# Patient Record
Sex: FEMALE | Race: WHITE | NOT HISPANIC OR LATINO
[De-identification: names, ages, dates, MRNs, and addresses within clinical notes are randomized per-mention and may not be internally consistent; named-entity substitution may affect disease eponyms.]

---

## 2019-08-20 ENCOUNTER — MESSAGE (OUTPATIENT)
Age: 24
End: 2019-08-20

## 2019-08-20 PROBLEM — Z00.00 ENCOUNTER FOR PREVENTIVE HEALTH EXAMINATION: Status: ACTIVE | Noted: 2019-08-20

## 2019-08-27 ENCOUNTER — FORM ENCOUNTER (OUTPATIENT)
Age: 24
End: 2019-08-27

## 2019-08-28 ENCOUNTER — OUTPATIENT (OUTPATIENT)
Dept: OUTPATIENT SERVICES | Facility: HOSPITAL | Age: 24
LOS: 1 days | End: 2019-08-28
Payer: COMMERCIAL

## 2019-08-28 ENCOUNTER — APPOINTMENT (OUTPATIENT)
Dept: ORTHOPEDIC SURGERY | Facility: CLINIC | Age: 24
End: 2019-08-28
Payer: COMMERCIAL

## 2019-08-28 VITALS — BODY MASS INDEX: 31.89 KG/M2 | WEIGHT: 180 LBS | HEIGHT: 63 IN

## 2019-08-28 PROCEDURE — 99203 OFFICE O/P NEW LOW 30 MIN: CPT

## 2019-08-28 PROCEDURE — 73522 X-RAY EXAM HIPS BI 3-4 VIEWS: CPT | Mod: 26

## 2019-08-29 ENCOUNTER — INPATIENT (INPATIENT)
Facility: HOSPITAL | Age: 24
LOS: 1 days | Discharge: HOME | DRG: 470 | End: 2019-08-31
Attending: ORTHOPAEDIC SURGERY | Admitting: ORTHOPAEDIC SURGERY
Payer: COMMERCIAL

## 2019-08-29 ENCOUNTER — APPOINTMENT (OUTPATIENT)
Dept: ORTHOPEDIC SURGERY | Facility: HOSPITAL | Age: 24
End: 2019-08-29
Payer: COMMERCIAL

## 2019-08-29 VITALS
HEIGHT: 63 IN | SYSTOLIC BLOOD PRESSURE: 116 MMHG | WEIGHT: 187.39 LBS | RESPIRATION RATE: 20 BRPM | HEART RATE: 86 BPM | TEMPERATURE: 99 F | DIASTOLIC BLOOD PRESSURE: 70 MMHG

## 2019-08-29 DIAGNOSIS — M87.00 IDIOPATHIC ASEPTIC NECROSIS OF UNSPECIFIED BONE: ICD-10-CM

## 2019-08-29 DIAGNOSIS — Z98.890 OTHER SPECIFIED POSTPROCEDURAL STATES: Chronic | ICD-10-CM

## 2019-08-29 DIAGNOSIS — M87.9 OSTEONECROSIS, UNSPECIFIED: ICD-10-CM

## 2019-08-29 LAB — HCG SERPL-ACNC: <0 MIU/ML — SIGNIFICANT CHANGE UP

## 2019-08-29 PROCEDURE — 72170 X-RAY EXAM OF PELVIS: CPT | Mod: 26

## 2019-08-29 PROCEDURE — 20999 UNLISTED PX MUSCSKEL GENERAL: CPT

## 2019-08-29 PROCEDURE — 26992 DRAINAGE OF BONE LESION: CPT | Mod: LT

## 2019-08-29 PROCEDURE — 27036 CAPSULECTOMY/CAPSULOTOMY HIP: CPT | Mod: 59,LT

## 2019-08-29 PROCEDURE — 73522 X-RAY EXAM HIPS BI 3-4 VIEWS: CPT

## 2019-08-29 PROCEDURE — 27130 TOTAL HIP ARTHROPLASTY: CPT | Mod: RT

## 2019-08-29 PROCEDURE — 27130 TOTAL HIP ARTHROPLASTY: CPT | Mod: AS,RT

## 2019-08-29 RX ORDER — ASPIRIN/CALCIUM CARB/MAGNESIUM 324 MG
325 TABLET ORAL
Refills: 0 | Status: DISCONTINUED | OUTPATIENT
Start: 2019-08-29 | End: 2019-08-31

## 2019-08-29 RX ORDER — OXYCODONE HYDROCHLORIDE 5 MG/1
5 TABLET ORAL EVERY 4 HOURS
Refills: 0 | Status: DISCONTINUED | OUTPATIENT
Start: 2019-08-29 | End: 2019-08-30

## 2019-08-29 RX ORDER — CEFAZOLIN SODIUM 1 G
2000 VIAL (EA) INJECTION EVERY 8 HOURS
Refills: 0 | Status: COMPLETED | OUTPATIENT
Start: 2019-08-29 | End: 2019-08-30

## 2019-08-29 RX ORDER — CEFAZOLIN SODIUM 1 G
2000 VIAL (EA) INJECTION EVERY 8 HOURS
Refills: 0 | Status: DISCONTINUED | OUTPATIENT
Start: 2019-08-29 | End: 2019-08-29

## 2019-08-29 RX ORDER — HYDROMORPHONE HYDROCHLORIDE 2 MG/ML
0.5 INJECTION INTRAMUSCULAR; INTRAVENOUS; SUBCUTANEOUS EVERY 4 HOURS
Refills: 0 | Status: DISCONTINUED | OUTPATIENT
Start: 2019-08-29 | End: 2019-08-31

## 2019-08-29 RX ORDER — ASPIRIN/CALCIUM CARB/MAGNESIUM 324 MG
81 TABLET ORAL
Refills: 0 | Status: DISCONTINUED | OUTPATIENT
Start: 2019-08-29 | End: 2019-08-29

## 2019-08-29 RX ORDER — MAGNESIUM HYDROXIDE 400 MG/1
30 TABLET, CHEWABLE ORAL DAILY
Refills: 0 | Status: DISCONTINUED | OUTPATIENT
Start: 2019-08-29 | End: 2019-08-31

## 2019-08-29 RX ORDER — METOCLOPRAMIDE HCL 10 MG
10 TABLET ORAL EVERY 8 HOURS
Refills: 0 | Status: DISCONTINUED | OUTPATIENT
Start: 2019-08-29 | End: 2019-08-29

## 2019-08-29 RX ORDER — OXYCODONE HYDROCHLORIDE 5 MG/1
10 TABLET ORAL EVERY 4 HOURS
Refills: 0 | Status: DISCONTINUED | OUTPATIENT
Start: 2019-08-29 | End: 2019-08-30

## 2019-08-29 RX ORDER — CHLORHEXIDINE GLUCONATE 213 G/1000ML
1 SOLUTION TOPICAL EVERY 12 HOURS
Refills: 0 | Status: DISCONTINUED | OUTPATIENT
Start: 2019-08-29 | End: 2019-08-31

## 2019-08-29 RX ORDER — METOCLOPRAMIDE HCL 10 MG
10 TABLET ORAL EVERY 8 HOURS
Refills: 0 | Status: DISCONTINUED | OUTPATIENT
Start: 2019-08-29 | End: 2019-08-31

## 2019-08-29 RX ORDER — BUPIVACAINE 13.3 MG/ML
20 INJECTION, SUSPENSION, LIPOSOMAL INFILTRATION ONCE
Refills: 0 | Status: DISCONTINUED | OUTPATIENT
Start: 2019-08-29 | End: 2019-08-31

## 2019-08-29 RX ORDER — SENNA PLUS 8.6 MG/1
2 TABLET ORAL AT BEDTIME
Refills: 0 | Status: DISCONTINUED | OUTPATIENT
Start: 2019-08-29 | End: 2019-08-31

## 2019-08-29 RX ORDER — ACETAMINOPHEN 500 MG
650 TABLET ORAL EVERY 6 HOURS
Refills: 0 | Status: DISCONTINUED | OUTPATIENT
Start: 2019-08-29 | End: 2019-08-30

## 2019-08-29 RX ORDER — ONDANSETRON 8 MG/1
4 TABLET, FILM COATED ORAL EVERY 6 HOURS
Refills: 0 | Status: DISCONTINUED | OUTPATIENT
Start: 2019-08-29 | End: 2019-08-31

## 2019-08-29 RX ORDER — HYDROMORPHONE HYDROCHLORIDE 2 MG/ML
0.5 INJECTION INTRAMUSCULAR; INTRAVENOUS; SUBCUTANEOUS
Refills: 0 | Status: DISCONTINUED | OUTPATIENT
Start: 2019-08-29 | End: 2019-08-29

## 2019-08-29 RX ORDER — SODIUM CHLORIDE 9 MG/ML
1000 INJECTION, SOLUTION INTRAVENOUS
Refills: 0 | Status: DISCONTINUED | OUTPATIENT
Start: 2019-08-29 | End: 2019-08-30

## 2019-08-29 RX ORDER — POLYETHYLENE GLYCOL 3350 17 G/17G
17 POWDER, FOR SOLUTION ORAL DAILY
Refills: 0 | Status: DISCONTINUED | OUTPATIENT
Start: 2019-08-29 | End: 2019-08-31

## 2019-08-29 RX ORDER — POVIDONE-IODINE 5 %
1 AEROSOL (ML) TOPICAL ONCE
Refills: 0 | Status: COMPLETED | OUTPATIENT
Start: 2019-08-29 | End: 2019-08-29

## 2019-08-29 RX ORDER — DOCUSATE SODIUM 100 MG
100 CAPSULE ORAL THREE TIMES A DAY
Refills: 0 | Status: DISCONTINUED | OUTPATIENT
Start: 2019-08-29 | End: 2019-08-31

## 2019-08-29 RX ORDER — SCOPALAMINE 1 MG/3D
1 PATCH, EXTENDED RELEASE TRANSDERMAL ONCE
Refills: 0 | Status: COMPLETED | OUTPATIENT
Start: 2019-08-29 | End: 2019-08-29

## 2019-08-29 RX ORDER — ASPIRIN/CALCIUM CARB/MAGNESIUM 324 MG
325 TABLET ORAL
Refills: 0 | Status: DISCONTINUED | OUTPATIENT
Start: 2019-08-29 | End: 2019-08-29

## 2019-08-29 RX ADMIN — OXYCODONE HYDROCHLORIDE 10 MILLIGRAM(S): 5 TABLET ORAL at 20:30

## 2019-08-29 RX ADMIN — Medication 650 MILLIGRAM(S): at 20:45

## 2019-08-29 RX ADMIN — ONDANSETRON 4 MILLIGRAM(S): 8 TABLET, FILM COATED ORAL at 18:00

## 2019-08-29 RX ADMIN — HYDROMORPHONE HYDROCHLORIDE 0.5 MILLIGRAM(S): 2 INJECTION INTRAMUSCULAR; INTRAVENOUS; SUBCUTANEOUS at 17:50

## 2019-08-29 RX ADMIN — Medication 100 MILLIGRAM(S): at 22:29

## 2019-08-29 RX ADMIN — Medication 2000 MILLIGRAM(S): at 22:29

## 2019-08-29 RX ADMIN — HYDROMORPHONE HYDROCHLORIDE 0.5 MILLIGRAM(S): 2 INJECTION INTRAMUSCULAR; INTRAVENOUS; SUBCUTANEOUS at 17:30

## 2019-08-29 RX ADMIN — CHLORHEXIDINE GLUCONATE 1 APPLICATION(S): 213 SOLUTION TOPICAL at 09:30

## 2019-08-29 RX ADMIN — HYDROMORPHONE HYDROCHLORIDE 0.5 MILLIGRAM(S): 2 INJECTION INTRAMUSCULAR; INTRAVENOUS; SUBCUTANEOUS at 22:42

## 2019-08-29 RX ADMIN — HYDROMORPHONE HYDROCHLORIDE 0.5 MILLIGRAM(S): 2 INJECTION INTRAMUSCULAR; INTRAVENOUS; SUBCUTANEOUS at 22:27

## 2019-08-29 RX ADMIN — Medication 650 MILLIGRAM(S): at 21:45

## 2019-08-29 RX ADMIN — OXYCODONE HYDROCHLORIDE 10 MILLIGRAM(S): 5 TABLET ORAL at 21:30

## 2019-08-29 RX ADMIN — Medication 10 MILLIGRAM(S): at 19:44

## 2019-08-29 RX ADMIN — Medication 1 APPLICATION(S): at 10:04

## 2019-08-29 RX ADMIN — SCOPALAMINE 1 PATCH: 1 PATCH, EXTENDED RELEASE TRANSDERMAL at 20:46

## 2019-08-29 RX ADMIN — SODIUM CHLORIDE 100 MILLILITER(S): 9 INJECTION, SOLUTION INTRAVENOUS at 18:57

## 2019-08-29 RX ADMIN — HYDROMORPHONE HYDROCHLORIDE 0.5 MILLIGRAM(S): 2 INJECTION INTRAMUSCULAR; INTRAVENOUS; SUBCUTANEOUS at 17:05

## 2019-08-29 NOTE — H&P ADULT - HISTORY OF PRESENT ILLNESS
23 y.o F with b/l hip pain (right > left). Has had conservative treatment but still symptomatic.  States diagnosed with AVN, possibly steroid related.    Presents for elective b/l hip bone graft procedure with possible right total hip replacement surgery.  Independent ambulator. No numbness of LE. No dysuria or incontinence. No fever, chills, or recent illness.  No other complaints.    Patient Discussed with surgeon the possibility of right total hip replacement

## 2019-08-29 NOTE — PROGRESS NOTE ADULT - ASSESSMENT
A/P: 23yFemale s/p R MARTÍN and L hip core decompression  - Stable  - Pain Control  - DVT ppx: ASA 81 BID  - Post op abx: Ancef 2g Q8H x 2 doses  - WBS: 50% PWB b/l  - PT: pending PT eval    Ortho Pager [phone] A/P: 23yFemale s/p R MARTÍN and L hip core decompression  - Stable  - Pain Control  - DVT ppx:  BID  - Post op abx: Ancef 2g Q8H x 2 doses  - WBS: 50% PWB b/l  - PT: pending PT eval    Ortho Pager [phone]

## 2019-08-29 NOTE — H&P ADULT - NSHPPHYSICALEXAM_GEN_ALL_CORE
NAD    MSK: limited ROM HIPS secondary to pain  ehl, tib ant, GS 5/5 b/l LE  DP palpable b/l  calf soft, compressible b/l  gross sensation intact to light touch b/l LE      rest of PE per medical clearance note

## 2019-08-29 NOTE — PROGRESS NOTE ADULT - SUBJECTIVE AND OBJECTIVE BOX
Ortho Post Op Check    Procedure: R MARTÍN and L hip bone graft  Surgeon: Georges    Pt comfortable without complaints, pain controlled  Denies CP, SOB, N/V, numbness/tingling     Vital Signs Last 24 Hrs  T(C): 36.4 (29 Aug 2019 16:42), Max: 37 (29 Aug 2019 09:44)  T(F): 97.5 (29 Aug 2019 16:42), Max: 98.6 (29 Aug 2019 09:44)  HR: 96 (29 Aug 2019 17:12) (86 - 118)  BP: 137/65 (29 Aug 2019 17:12) (116/70 - 142/75)  BP(mean): 93 (29 Aug 2019 17:12) (82 - 103)  RR: 18 (29 Aug 2019 17:12) (16 - 20)  SpO2: 94% (29 Aug 2019 17:12) (91% - 100%)    AVSS  General: Pt Alert and oriented, NAD  RLE: Gauze/Abd/Teg DSG C/D/I  LLE: Gauze/Teg DSG C/D/I  Pulses: Feet WWP; DP pulses 2+; Cap refill < 2 sec  Sensation: SILT and symmetric  Motor: EHL/FHL/TA/GS 5/5 and symmetric              Post-op X-Ray: R MARTÍN w components in place

## 2019-08-29 NOTE — ASU PREOP CHECKLIST - 4.
machine icon unable to process urine possibly due to highly viscous urine.    Blood pregnancy test sent stat

## 2019-08-30 ENCOUNTER — TRANSCRIPTION ENCOUNTER (OUTPATIENT)
Age: 24
End: 2019-08-30

## 2019-08-30 LAB
ANION GAP SERPL CALC-SCNC: 11 MMOL/L — SIGNIFICANT CHANGE UP (ref 5–17)
BUN SERPL-MCNC: 5 MG/DL — LOW (ref 7–23)
CALCIUM SERPL-MCNC: 8.6 MG/DL — SIGNIFICANT CHANGE UP (ref 8.4–10.5)
CHLORIDE SERPL-SCNC: 103 MMOL/L — SIGNIFICANT CHANGE UP (ref 96–108)
CO2 SERPL-SCNC: 25 MMOL/L — SIGNIFICANT CHANGE UP (ref 22–31)
CREAT SERPL-MCNC: 0.57 MG/DL — SIGNIFICANT CHANGE UP (ref 0.5–1.3)
GLUCOSE SERPL-MCNC: 130 MG/DL — HIGH (ref 70–99)
HCT VFR BLD CALC: 35.5 % — SIGNIFICANT CHANGE UP (ref 34.5–45)
HGB BLD-MCNC: 11.6 G/DL — SIGNIFICANT CHANGE UP (ref 11.5–15.5)
MCHC RBC-ENTMCNC: 28.6 PG — SIGNIFICANT CHANGE UP (ref 27–34)
MCHC RBC-ENTMCNC: 32.7 GM/DL — SIGNIFICANT CHANGE UP (ref 32–36)
MCV RBC AUTO: 87.4 FL — SIGNIFICANT CHANGE UP (ref 80–100)
NRBC # BLD: 0 /100 WBCS — SIGNIFICANT CHANGE UP (ref 0–0)
PLATELET # BLD AUTO: 264 K/UL — SIGNIFICANT CHANGE UP (ref 150–400)
POTASSIUM SERPL-MCNC: 3.7 MMOL/L — SIGNIFICANT CHANGE UP (ref 3.5–5.3)
POTASSIUM SERPL-SCNC: 3.7 MMOL/L — SIGNIFICANT CHANGE UP (ref 3.5–5.3)
RBC # BLD: 4.06 M/UL — SIGNIFICANT CHANGE UP (ref 3.8–5.2)
RBC # FLD: 13.4 % — SIGNIFICANT CHANGE UP (ref 10.3–14.5)
SODIUM SERPL-SCNC: 139 MMOL/L — SIGNIFICANT CHANGE UP (ref 135–145)
WBC # BLD: 9.95 K/UL — SIGNIFICANT CHANGE UP (ref 3.8–10.5)
WBC # FLD AUTO: 9.95 K/UL — SIGNIFICANT CHANGE UP (ref 3.8–10.5)

## 2019-08-30 PROCEDURE — 99223 1ST HOSP IP/OBS HIGH 75: CPT

## 2019-08-30 RX ORDER — ACETAMINOPHEN 500 MG
3 TABLET ORAL
Qty: 0 | Refills: 0 | DISCHARGE
Start: 2019-08-30

## 2019-08-30 RX ORDER — CELECOXIB 200 MG/1
200 CAPSULE ORAL
Refills: 0 | Status: DISCONTINUED | OUTPATIENT
Start: 2019-08-30 | End: 2019-08-31

## 2019-08-30 RX ORDER — KETOROLAC TROMETHAMINE 30 MG/ML
30 SYRINGE (ML) INJECTION ONCE
Refills: 0 | Status: DISCONTINUED | OUTPATIENT
Start: 2019-08-30 | End: 2019-08-30

## 2019-08-30 RX ORDER — HYDROMORPHONE HYDROCHLORIDE 2 MG/ML
1 INJECTION INTRAMUSCULAR; INTRAVENOUS; SUBCUTANEOUS
Qty: 40 | Refills: 0
Start: 2019-08-30 | End: 2019-09-05

## 2019-08-30 RX ORDER — ASPIRIN/CALCIUM CARB/MAGNESIUM 324 MG
1 TABLET ORAL
Qty: 60 | Refills: 0
Start: 2019-08-30 | End: 2019-09-28

## 2019-08-30 RX ORDER — HYDROMORPHONE HYDROCHLORIDE 2 MG/ML
4 INJECTION INTRAMUSCULAR; INTRAVENOUS; SUBCUTANEOUS EVERY 4 HOURS
Refills: 0 | Status: DISCONTINUED | OUTPATIENT
Start: 2019-08-30 | End: 2019-08-31

## 2019-08-30 RX ORDER — HYDROMORPHONE HYDROCHLORIDE 2 MG/ML
2 INJECTION INTRAMUSCULAR; INTRAVENOUS; SUBCUTANEOUS EVERY 4 HOURS
Refills: 0 | Status: DISCONTINUED | OUTPATIENT
Start: 2019-08-30 | End: 2019-08-31

## 2019-08-30 RX ORDER — ACETAMINOPHEN 500 MG
1000 TABLET ORAL ONCE
Refills: 0 | Status: COMPLETED | OUTPATIENT
Start: 2019-08-30 | End: 2019-08-30

## 2019-08-30 RX ORDER — POLYETHYLENE GLYCOL 3350 17 G/17G
17 POWDER, FOR SOLUTION ORAL
Qty: 0 | Refills: 0 | DISCHARGE
Start: 2019-08-30

## 2019-08-30 RX ORDER — SENNA PLUS 8.6 MG/1
2 TABLET ORAL
Qty: 0 | Refills: 0 | DISCHARGE
Start: 2019-08-30

## 2019-08-30 RX ORDER — SODIUM CHLORIDE 9 MG/ML
1000 INJECTION INTRAMUSCULAR; INTRAVENOUS; SUBCUTANEOUS ONCE
Refills: 0 | Status: COMPLETED | OUTPATIENT
Start: 2019-08-30 | End: 2019-08-30

## 2019-08-30 RX ORDER — DOCUSATE SODIUM 100 MG
1 CAPSULE ORAL
Qty: 0 | Refills: 0 | DISCHARGE
Start: 2019-08-30

## 2019-08-30 RX ORDER — CELECOXIB 200 MG/1
1 CAPSULE ORAL
Qty: 60 | Refills: 0
Start: 2019-08-30 | End: 2019-09-28

## 2019-08-30 RX ORDER — POLYETHYLENE GLYCOL 3350 17 G/17G
17 POWDER, FOR SOLUTION ORAL DAILY
Refills: 0 | Status: DISCONTINUED | OUTPATIENT
Start: 2019-08-30 | End: 2019-08-30

## 2019-08-30 RX ORDER — ACETAMINOPHEN 500 MG
975 TABLET ORAL EVERY 8 HOURS
Refills: 0 | Status: DISCONTINUED | OUTPATIENT
Start: 2019-08-30 | End: 2019-08-31

## 2019-08-30 RX ADMIN — HYDROMORPHONE HYDROCHLORIDE 4 MILLIGRAM(S): 2 INJECTION INTRAMUSCULAR; INTRAVENOUS; SUBCUTANEOUS at 22:55

## 2019-08-30 RX ADMIN — CELECOXIB 200 MILLIGRAM(S): 200 CAPSULE ORAL at 17:12

## 2019-08-30 RX ADMIN — HYDROMORPHONE HYDROCHLORIDE 0.5 MILLIGRAM(S): 2 INJECTION INTRAMUSCULAR; INTRAVENOUS; SUBCUTANEOUS at 07:24

## 2019-08-30 RX ADMIN — Medication 650 MILLIGRAM(S): at 09:55

## 2019-08-30 RX ADMIN — HYDROMORPHONE HYDROCHLORIDE 0.5 MILLIGRAM(S): 2 INJECTION INTRAMUSCULAR; INTRAVENOUS; SUBCUTANEOUS at 02:27

## 2019-08-30 RX ADMIN — OXYCODONE HYDROCHLORIDE 5 MILLIGRAM(S): 5 TABLET ORAL at 02:15

## 2019-08-30 RX ADMIN — OXYCODONE HYDROCHLORIDE 5 MILLIGRAM(S): 5 TABLET ORAL at 06:40

## 2019-08-30 RX ADMIN — OXYCODONE HYDROCHLORIDE 5 MILLIGRAM(S): 5 TABLET ORAL at 01:15

## 2019-08-30 RX ADMIN — Medication 30 MILLIGRAM(S): at 09:58

## 2019-08-30 RX ADMIN — Medication 1000 MILLIGRAM(S): at 13:23

## 2019-08-30 RX ADMIN — CELECOXIB 200 MILLIGRAM(S): 200 CAPSULE ORAL at 17:45

## 2019-08-30 RX ADMIN — SCOPALAMINE 1 PATCH: 1 PATCH, EXTENDED RELEASE TRANSDERMAL at 06:59

## 2019-08-30 RX ADMIN — HYDROMORPHONE HYDROCHLORIDE 4 MILLIGRAM(S): 2 INJECTION INTRAMUSCULAR; INTRAVENOUS; SUBCUTANEOUS at 18:25

## 2019-08-30 RX ADMIN — POLYETHYLENE GLYCOL 3350 17 GRAM(S): 17 POWDER, FOR SOLUTION ORAL at 13:05

## 2019-08-30 RX ADMIN — OXYCODONE HYDROCHLORIDE 5 MILLIGRAM(S): 5 TABLET ORAL at 05:40

## 2019-08-30 RX ADMIN — Medication 2000 MILLIGRAM(S): at 05:39

## 2019-08-30 RX ADMIN — Medication 100 MILLIGRAM(S): at 13:05

## 2019-08-30 RX ADMIN — HYDROMORPHONE HYDROCHLORIDE 4 MILLIGRAM(S): 2 INJECTION INTRAMUSCULAR; INTRAVENOUS; SUBCUTANEOUS at 23:55

## 2019-08-30 RX ADMIN — HYDROMORPHONE HYDROCHLORIDE 4 MILLIGRAM(S): 2 INJECTION INTRAMUSCULAR; INTRAVENOUS; SUBCUTANEOUS at 19:00

## 2019-08-30 RX ADMIN — HYDROMORPHONE HYDROCHLORIDE 4 MILLIGRAM(S): 2 INJECTION INTRAMUSCULAR; INTRAVENOUS; SUBCUTANEOUS at 12:00

## 2019-08-30 RX ADMIN — SODIUM CHLORIDE 500 MILLILITER(S): 9 INJECTION INTRAMUSCULAR; INTRAVENOUS; SUBCUTANEOUS at 16:17

## 2019-08-30 RX ADMIN — Medication 325 MILLIGRAM(S): at 17:12

## 2019-08-30 RX ADMIN — SCOPALAMINE 1 PATCH: 1 PATCH, EXTENDED RELEASE TRANSDERMAL at 20:00

## 2019-08-30 RX ADMIN — Medication 30 MILLIGRAM(S): at 10:15

## 2019-08-30 RX ADMIN — HYDROMORPHONE HYDROCHLORIDE 4 MILLIGRAM(S): 2 INJECTION INTRAMUSCULAR; INTRAVENOUS; SUBCUTANEOUS at 11:30

## 2019-08-30 RX ADMIN — Medication 325 MILLIGRAM(S): at 05:39

## 2019-08-30 RX ADMIN — Medication 975 MILLIGRAM(S): at 22:18

## 2019-08-30 RX ADMIN — HYDROMORPHONE HYDROCHLORIDE 0.5 MILLIGRAM(S): 2 INJECTION INTRAMUSCULAR; INTRAVENOUS; SUBCUTANEOUS at 07:09

## 2019-08-30 RX ADMIN — Medication 975 MILLIGRAM(S): at 21:18

## 2019-08-30 RX ADMIN — HYDROMORPHONE HYDROCHLORIDE 0.5 MILLIGRAM(S): 2 INJECTION INTRAMUSCULAR; INTRAVENOUS; SUBCUTANEOUS at 02:45

## 2019-08-30 RX ADMIN — Medication 650 MILLIGRAM(S): at 09:20

## 2019-08-30 RX ADMIN — Medication 400 MILLIGRAM(S): at 13:02

## 2019-08-30 RX ADMIN — Medication 100 MILLIGRAM(S): at 05:39

## 2019-08-30 RX ADMIN — Medication 100 MILLIGRAM(S): at 21:18

## 2019-08-30 NOTE — PROGRESS NOTE ADULT - SUBJECTIVE AND OBJECTIVE BOX
Ortho Note    Pt reports moderate pain but improves with pain meds  Denies CP, SOB, N/V, numbness/tingling     Vital Signs Last 24 Hrs  T(C): 37.3 (08-30-19 @ 08:25), Max: 37.3 (08-30-19 @ 08:25)  T(F): 99.1 (08-30-19 @ 08:25), Max: 99.1 (08-30-19 @ 08:25)  HR: 106 (08-30-19 @ 10:20) (70 - 106)  BP: 120/56 (08-30-19 @ 10:20) (102/64 - 120/59)  BP(mean): --  RR: 18 (08-30-19 @ 10:20) (16 - 18)  SpO2: 98% (08-30-19 @ 10:20) (98% - 98%)  AVSS    General: Pt Alert and oriented, NAD  Bilateral hip DSG C/D/I  Pulses: 2+ DP and PT BL  Sensation: s/s/sp/dp/t intact BL  Motor: EHL/FHL/TA/GS 5/5 BL                          11.6   9.95  )-----------( 264      ( 30 Aug 2019 07:24 )             35.5   30 Aug 2019 07:24    139    |  103    |  5      ----------------------------<  130    3.7     |  25     |  0.57     Ca    8.6        30 Aug 2019 07:24        A/P: 23yFemale POD# s/p R MARTÍN and L hip bone graft 8/29  - Stable  - Pain Control  - DVT ppx: ASA  - PT, WBS: PWB 50%  - dispo pending PT eval     Ortho Pager [phone]

## 2019-08-30 NOTE — PROGRESS NOTE ADULT - SUBJECTIVE AND OBJECTIVE BOX
ORTHO NOTE    [x ] Pt seen/examined.  [ ] Pt without any complaints/in NAD.    [x ] Pt complains of: bilateral hip pain reports oxycodone "does not even touch the pain"      ROS: [ ] Fever  [ ] Chills  [ ] CP [ ] SOB [ ] Dysnea  [ ] Palpitations [ ] Cough [ ] N/V/C/D [ ] Paresthia [ ] Other     [x ] ROS  otherwise negative    .    PHYSICAL EXAM:    Vital Signs Last 24 Hrs  T(C): 37.3 (30 Aug 2019 08:25), Max: 37.3 (30 Aug 2019 08:25)  T(F): 99.1 (30 Aug 2019 08:25), Max: 99.1 (30 Aug 2019 08:25)  HR: 106 (30 Aug 2019 10:20) (70 - 118)  BP: 120/56 (30 Aug 2019 10:20) (102/64 - 145/69)  BP(mean): 90 (29 Aug 2019 18:30) (82 - 103)  RR: 18 (30 Aug 2019 10:20) (10 - 18)  SpO2: 98% (30 Aug 2019 10:20) (91% - 100%)    I&O's Detail    29 Aug 2019 07:01  -  30 Aug 2019 07:00  --------------------------------------------------------  IN:    lactated ringers.: 1400 mL  Total IN: 1400 mL    OUT:    Voided: 1150 mL  Total OUT: 1150 mL    Total NET: 250 mL      30 Aug 2019 07:01  -  30 Aug 2019 11:05  --------------------------------------------------------  IN:    Oral Fluid: 240 mL  Total IN: 240 mL    OUT:    Voided: 200 mL  Total OUT: 200 mL    Total NET: 40 mL           CAPILLARY BLOOD GLUCOSE                      Neuro: AAOX3    Lungs: nonlabored, Spo2 wnl on RA    CV:    ABD: soft, nontender    Ext:  RLE: Gauze/Abd/Teg DSG C/D/I anterior lateral right hip  LLE: Gauze/Teg DSG C/D/I anterior lateral left hip  Pulses: Feet WWP; DP pulses 2+; Cap refill < 2 sec  Sensation: SILT and symmetric  Motor: EHL/FHL/TA/GS 5/5 and symmetric    LABS                        11.6   9.95  )-----------( 264      ( 30 Aug 2019 07:24 )             35.5                                08-30    139  |  103  |  5<L>  ----------------------------<  130<H>  3.7   |  25  |  0.57    Ca    8.6      30 Aug 2019 07:24        [ ] Other Labs  [ ] None ordered            Please check or [place] when present:  •  Heart Failure:    [ ] Acute        [ ]  Acute on Chronic        [ ] Chronic         [ ] Diastolic     [ ]  Combined    •  JOANNE:     [ ] ATN        [ ]  Renal medullary necrosis       [ ]  Renal cortical necrosis                  [ ] Other pathological Lesion:  •  CKD:  [ ] Stage I   [ ] Stage II  [ ] Stage III    [ ]Stage IV   [ ]  CKD V   [ ]  Other/Unspecified:    •  Abdominal Nutritional Status:   [ ] Malnutrition-See Nutrition note    [ ] Cachexia   [ ]  Other        [ ] Supplement ordered:            [ ] Morbid Obesity: BMI >=40         ASSESSMENT/PLAN:      STATUS POST: pod1 R MARTÍN anterior lateral approach, left hip bone graft  pain control- IV tylenol 1g x1 dose, acetaminophen 975mg q 8 standing, hydromorphone 2mg q 4 prn moderate pain (4-6), hydromorphone 4mg q 4 prn severe pain (7-10), toradol x1 dose, ice hip sleeves applied to b/l hips for comfort  bowel regimen    CONTINUE:          [ ] PT- R LE protective weight bearing, L LE 50% pwb    [ ] DVT PPX- scd, ASA 325mg po bid, bilateral LE alonzo stockings    [ ] Pain Mgt- po meds    [ ] Dispo plan- home with parents driving to [place] ORTHO NOTE    [x ] Pt seen/examined.  [ ] Pt without any complaints/in NAD.    [x ] Pt complains of: bilateral hip pain reports oxycodone "does not even touch the pain"      ROS: [ ] Fever  [ ] Chills  [ ] CP [ ] SOB [ ] Dysnea  [ ] Palpitations [ ] Cough [ ] N/V/C/D [ ] Paresthia [ ] Other     [x ] ROS  otherwise negative    .    PHYSICAL EXAM:    Vital Signs Last 24 Hrs  T(C): 37.3 (30 Aug 2019 08:25), Max: 37.3 (30 Aug 2019 08:25)  T(F): 99.1 (30 Aug 2019 08:25), Max: 99.1 (30 Aug 2019 08:25)  HR: 106 (30 Aug 2019 10:20) (70 - 118)  BP: 120/56 (30 Aug 2019 10:20) (102/64 - 145/69)  BP(mean): 90 (29 Aug 2019 18:30) (82 - 103)  RR: 18 (30 Aug 2019 10:20) (10 - 18)  SpO2: 98% (30 Aug 2019 10:20) (91% - 100%)    I&O's Detail    29 Aug 2019 07:01  -  30 Aug 2019 07:00  --------------------------------------------------------  IN:    lactated ringers.: 1400 mL  Total IN: 1400 mL    OUT:    Voided: 1150 mL  Total OUT: 1150 mL    Total NET: 250 mL      30 Aug 2019 07:01  -  30 Aug 2019 11:05  --------------------------------------------------------  IN:    Oral Fluid: 240 mL  Total IN: 240 mL    OUT:    Voided: 200 mL  Total OUT: 200 mL    Total NET: 40 mL           CAPILLARY BLOOD GLUCOSE                      Neuro: AAOX3    Lungs: nonlabored, Spo2 wnl on RA    CV:    ABD: soft, nontender    Ext:  RLE: Gauze/Abd/Teg DSG C/D/I anterior lateral right hip  LLE: Gauze/Teg DSG C/D/I anterior lateral left hip  Pulses: Feet WWP; DP pulses 2+; Cap refill < 2 sec  Sensation: SILT and symmetric  Motor: EHL/FHL/TA/GS 5/5 and symmetric    LABS                        11.6   9.95  )-----------( 264      ( 30 Aug 2019 07:24 )             35.5                                08-30    139  |  103  |  5<L>  ----------------------------<  130<H>  3.7   |  25  |  0.57    Ca    8.6      30 Aug 2019 07:24        [ ] Other Labs  [ ] None ordered            Please check or [place] when present:  •  Heart Failure:    [ ] Acute        [ ]  Acute on Chronic        [ ] Chronic         [ ] Diastolic     [ ]  Combined    •  JOANNE:     [ ] ATN        [ ]  Renal medullary necrosis       [ ]  Renal cortical necrosis                  [ ] Other pathological Lesion:  •  CKD:  [ ] Stage I   [ ] Stage II  [ ] Stage III    [ ]Stage IV   [ ]  CKD V   [ ]  Other/Unspecified:    •  Abdominal Nutritional Status:   [ ] Malnutrition-See Nutrition note    [ ] Cachexia   [ ]  Other        [ ] Supplement ordered:            [ ] Morbid Obesity: BMI >=40         ASSESSMENT/PLAN:      STATUS POST: pod1 R MARTÍN anterior lateral approach, left hip bone graft  pain control- IV tylenol 1g x1 dose, acetaminophen 975mg q 8 standing, hydromorphone 2mg q 4 prn moderate pain (4-6), hydromorphone 4mg q 4 prn severe pain (7-10), toradol x1 dose, ice hip sleeves applied to b/l hips for comfort  bowel regimen  Dr. Su approved NSAIDs for patient.  Celebrex 200mg po bid after meals added  CONTINUE:          [ ] PT- R LE protective weight bearing, L LE 50% pwb    [ ] DVT PPX- scd, ASA 325mg po bid, bilateral LE alonzo stockings    [ ] Pain Mgt- po meds    [ ] Dispo plan- home with parents driving to [place]

## 2019-08-30 NOTE — DISCHARGE NOTE PROVIDER - NSDCCPCAREPLAN_GEN_ALL_CORE_FT
PRINCIPAL DISCHARGE DIAGNOSIS  Diagnosis: Avascular necrosis  Assessment and Plan of Treatment: Avascular necrosis

## 2019-08-30 NOTE — DISCHARGE NOTE PROVIDER - HOSPITAL COURSE
Admit 8/29/19    OR- right total hip anterior lateral approach, left hip bone graft 8/29/19     Periop Antibx    DVT ppx    PT     Pain mgt    medicine consult Admit 8/29/19    OR- right total hip anterior lateral approach, left hip bone graft 8/29/19     Periop Antibx    DVT ppx    PT     Pain mgt    medicine consult    v

## 2019-08-30 NOTE — PHYSICAL THERAPY INITIAL EVALUATION ADULT - ACTIVE RANGE OF MOTION EXAMINATION, REHAB EVAL
bilateral  lower extremity Active ROM was WFL (within functional limits)/bilateral upper extremity Active ROM was WFL (within functional limits)/L hip flexion AAROM 0-35, R flexion 0-30

## 2019-08-30 NOTE — DISCHARGE NOTE PROVIDER - NSDCFUADDINST_GEN_ALL_CORE_FT
Protected weight bearing on right leg and 50% partial weight bearing on left leg using assistive device  Anterior hip precautions to prevent hip dislocation  No crossing your legs. Do not bend past your waist.  Use long-handled reach to pick things up if purchased.  Sit in a high chair.  If you do not have a high chair, use cushions on the chair  No strenuous activity, heavy lifting, driving or returning to work until cleared by MD.  You may shower - dressing is water-resistant, no soaking in bathtubs.  Keep dressing on your hip until the follow up appointment.  Try to have regular bowel movements, take stool softener or laxative if necessary.  May take Pepcid or Zantac for upset stomach.  Swelling may travel all the way down leg to foot, this is normal and will subside in a few weeks.  Call to schedule an appt with Dr. Su for follow up.    Contact your doctor if you experience: fever greater than 101.5, chills, chest pain, difficulty breathing, redness or excessive drainage around the incision, other concerns.  Follow up with your primary care provider.

## 2019-08-30 NOTE — PHYSICAL THERAPY INITIAL EVALUATION ADULT - CRITERIA FOR SKILLED THERAPEUTIC INTERVENTIONS
risk reduction/prevention/therapy frequency/functional limitations in following categories/predicted duration of therapy intervention/impairments found/anticipated equipment needs at discharge/anticipated discharge recommendation/rehab potential

## 2019-08-30 NOTE — PHYSICAL THERAPY INITIAL EVALUATION ADULT - ADDITIONAL COMMENTS
Patient will be staying in hotel in [place] with elevator in [place], driving back to [place]. Pt will be staying with parents in house with 2-3 BECKIE without rails, 6+6 steps to 2nd floor with landings. Will have bed on 1st floor, shower on 2nd floor with parents to assist. Pt owns crutches and commode. Using crutches pre op partial weight bearing.

## 2019-08-30 NOTE — CONSULT NOTE ADULT - CONSULT REASON
comanagement.     22yo F, PMH of AVN of hip. P/w chronic b/l hip pain (right > left). Has had failed conservative treatment. Admitted for elective Right TKA and left hip bone graft with Dr. Su. POD-1. Medicine consulted for comanagement.     PAST MEDICAL & SURGICAL HISTORY:  Chronic edema: legs  Avascular necrosis  S/P hernia repair: bilateral inguinal    Social: Denies etoh, smoking, drugs.     Allergies: prednisone?    Home Medications:  acetaminophen 325 mg oral tablet: 3 tab(s) orally every 8 hours, standing pain control for 2 weeks post-operatively.  Do not take more than 3,250mg in a day (30 Aug 2019 11:25)  bisacodyl 10 mg rectal suppository: 1 suppository(ies) rectal once a day, As needed, If no bowel movement by POD#2 (30 Aug 2019 11:25)  docusate sodium 100 mg oral capsule: 1 cap(s) orally 3 times a day (30 Aug 2019 11:25)  polyethylene glycol 3350 oral powder for reconstitution: 17 gram(s) orally once a day (30 Aug 2019 11:25)  senna oral tablet: 2 tab(s) orally once a day (at bedtime), As needed, Constipation (30 Aug 2019 11:25)

## 2019-08-30 NOTE — PHYSICAL THERAPY INITIAL EVALUATION ADULT - GENERAL OBSERVATIONS, REHAB EVAL
Spoke to CARI Ortega, pt cleared for PT and pre medicated. Pt POD #1 R anterolateral MARTÍN, L hip core decompression and hip graft, PWB BLE. Pt rcvd semi supine, +icepacks, +SCDs, +R heplock, parents bedside. Pt agreeable to PT, reports 7/10 pain. Tolerated session fairly well, demo Jocelyn bed mob, ModA x2 transfers, sidesteps and 2 steps forward MinAx2 with RW. Pt left OOB to chair, +callbell, needs in reach with RN aware. Demo incr pain, decr ROM, balance, and safety.. Fim gait=1

## 2019-08-30 NOTE — DISCHARGE NOTE PROVIDER - CARE PROVIDER_API CALL
Lowell Su)  Orthopaedic Surgery  [address]  [place], [place] [address]  Phone: [phone]  Fax: [phone]  Follow Up Time:

## 2019-08-30 NOTE — DISCHARGE NOTE PROVIDER - NSDCCPTREATMENT_GEN_ALL_CORE_FT
PRINCIPAL PROCEDURE  Procedure: Right total hip arthroplasty  Findings and Treatment:       SECONDARY PROCEDURE  Procedure: Bone graft to left femur from hip  Findings and Treatment:

## 2019-08-30 NOTE — CONSULT NOTE ADULT - SUBJECTIVE AND OBJECTIVE BOX
CC: No overnight events, no complaints.   Feeling well, pain is overall controlled.  Tolerates PO diet (+); Urination (+).  Denies cp, sob, dizziness, HA, abdominal pain, n/v.  Rest of ROS negative.     Vital Signs Last 24 Hrs  T(C): 37.3 (30 Aug 2019 08:25), Max: 37.3 (30 Aug 2019 08:25)  T(F): 99.1 (30 Aug 2019 08:25), Max: 99.1 (30 Aug 2019 08:25)  HR: 106 (30 Aug 2019 10:20) (70 - 118)  BP: 120/56 (30 Aug 2019 10:20) (102/64 - 145/69)  BP(mean): 90 (29 Aug 2019 18:30) (82 - 103)  RR: 18 (30 Aug 2019 10:20) (10 - 18)  SpO2: 98% (30 Aug 2019 10:20) (91% - 100%)    PHYSICAL EXAMINATION  * General: Not in acute distress. Awake and alert. Lying comfortably in bed.  * Head: Normocephalic, atraumatic.  * HEENT: ears no discharge, eyes PERRLA, nose no discharge, throat no exudates, normal tonsils.  * Neck: no JVD, supple.  * Lungs: Clear to auscultation, no rales, no wheezes.  * Cardio: Regular rate and rhythm, no murmurs, no rubs, no gallops. Good peripheral pulses.  * Abdomen: Soft, non-tender, non-distended, tympanic to percussion, no rebound, no guarding, no rigidity. Bowel sounds present. No suprapubic or CVA tenderness.  * : Deferred.  * Extremities: Acyanotic, no edema.  * Skin: Warm and dry.  * Neuro: Alert and oriented x 3. No focal deficits. Motor strength is 5/5 throughout. Sensation intact. Cranial nerves II-XII grossly intact.                           11.6   9.95  )-----------( 264      ( 30 Aug 2019 07:24 )             35.5     08-30    139  |  103  |  5<L>  ----------------------------<  130<H>  3.7   |  25  |  0.57    Ca    8.6      30 Aug 2019 07:24    MEDICATIONS  (STANDING):  acetaminophen   Tablet .. 975 milliGRAM(s) Oral every 8 hours  aspirin enteric coated 325 milliGRAM(s) Oral two times a day  BUpivacaine liposome 1.3% Injectable (no eMAR) 20 milliLiter(s) Local Injection once  celecoxib 200 milliGRAM(s) Oral two times a day  chlorhexidine 2% Cloths 1 Application(s) Topical every 12 hours  docusate sodium 100 milliGRAM(s) Oral three times a day  polyethylene glycol 3350 17 Gram(s) Oral daily    MEDICATIONS  (PRN):  aluminum hydroxide/magnesium hydroxide/simethicone Suspension 30 milliLiter(s) Oral four times a day PRN Indigestion  HYDROmorphone   Tablet 2 milliGRAM(s) Oral every 4 hours PRN Moderate Pain (4 - 6)  HYDROmorphone   Tablet 4 milliGRAM(s) Oral every 4 hours PRN Severe Pain (7 - 10)  HYDROmorphone  Injectable 0.5 milliGRAM(s) IV Push every 4 hours PRN Breakthrough pain  magnesium hydroxide Suspension 30 milliLiter(s) Oral daily PRN Constipation  metoclopramide Injectable 10 milliGRAM(s) IV Push every 8 hours PRN Nausea/Vomiting  ondansetron Injectable 4 milliGRAM(s) IV Push every 6 hours PRN Nausea and/or Vomiting  senna 2 Tablet(s) Oral at bedtime PRN Constipation

## 2019-08-30 NOTE — CONSULT NOTE ADULT - ASSESSMENT
24yo F, PMH of AVN of hip. P/w chronic b/l hip pain (right > left). Has had failed conservative treatment. Admitted for elective Right TKA and left hip bone graft with Dr. Su. POD-1. Medicine consulted for comanagement.     1) Post-op state  * OOB-C  * Physical therapy evaluation  * Aggressive incentive spirometry  * Pain control and bowel regimen  * Mechanical LE ppx     2) AVN hip.   * s/p TKA and bone graft.     3) VTEppx.   *  bid    Medically optimized for discharge

## 2019-08-31 ENCOUNTER — TRANSCRIPTION ENCOUNTER (OUTPATIENT)
Age: 24
End: 2019-08-31

## 2019-08-31 VITALS
SYSTOLIC BLOOD PRESSURE: 107 MMHG | TEMPERATURE: 98 F | HEART RATE: 112 BPM | RESPIRATION RATE: 18 BRPM | DIASTOLIC BLOOD PRESSURE: 71 MMHG | OXYGEN SATURATION: 99 %

## 2019-08-31 LAB
ANION GAP SERPL CALC-SCNC: 9 MMOL/L — SIGNIFICANT CHANGE UP (ref 5–17)
BUN SERPL-MCNC: 6 MG/DL — LOW (ref 7–23)
CALCIUM SERPL-MCNC: 8.5 MG/DL — SIGNIFICANT CHANGE UP (ref 8.4–10.5)
CHLORIDE SERPL-SCNC: 107 MMOL/L — SIGNIFICANT CHANGE UP (ref 96–108)
CO2 SERPL-SCNC: 24 MMOL/L — SIGNIFICANT CHANGE UP (ref 22–31)
CREAT SERPL-MCNC: 0.63 MG/DL — SIGNIFICANT CHANGE UP (ref 0.5–1.3)
GLUCOSE SERPL-MCNC: 112 MG/DL — HIGH (ref 70–99)
HCT VFR BLD CALC: 32.5 % — LOW (ref 34.5–45)
HGB BLD-MCNC: 10.2 G/DL — LOW (ref 11.5–15.5)
MCHC RBC-ENTMCNC: 28.2 PG — SIGNIFICANT CHANGE UP (ref 27–34)
MCHC RBC-ENTMCNC: 31.4 GM/DL — LOW (ref 32–36)
MCV RBC AUTO: 89.8 FL — SIGNIFICANT CHANGE UP (ref 80–100)
NRBC # BLD: 0 /100 WBCS — SIGNIFICANT CHANGE UP (ref 0–0)
PLATELET # BLD AUTO: 245 K/UL — SIGNIFICANT CHANGE UP (ref 150–400)
POTASSIUM SERPL-MCNC: 3.6 MMOL/L — SIGNIFICANT CHANGE UP (ref 3.5–5.3)
POTASSIUM SERPL-SCNC: 3.6 MMOL/L — SIGNIFICANT CHANGE UP (ref 3.5–5.3)
RBC # BLD: 3.62 M/UL — LOW (ref 3.8–5.2)
RBC # FLD: 14 % — SIGNIFICANT CHANGE UP (ref 10.3–14.5)
SODIUM SERPL-SCNC: 140 MMOL/L — SIGNIFICANT CHANGE UP (ref 135–145)
WBC # BLD: 10.32 K/UL — SIGNIFICANT CHANGE UP (ref 3.8–10.5)
WBC # FLD AUTO: 10.32 K/UL — SIGNIFICANT CHANGE UP (ref 3.8–10.5)

## 2019-08-31 RX ADMIN — Medication 100 MILLIGRAM(S): at 05:14

## 2019-08-31 RX ADMIN — CELECOXIB 200 MILLIGRAM(S): 200 CAPSULE ORAL at 05:14

## 2019-08-31 RX ADMIN — Medication 975 MILLIGRAM(S): at 06:14

## 2019-08-31 RX ADMIN — CELECOXIB 200 MILLIGRAM(S): 200 CAPSULE ORAL at 06:14

## 2019-08-31 RX ADMIN — POLYETHYLENE GLYCOL 3350 17 GRAM(S): 17 POWDER, FOR SOLUTION ORAL at 13:07

## 2019-08-31 RX ADMIN — HYDROMORPHONE HYDROCHLORIDE 4 MILLIGRAM(S): 2 INJECTION INTRAMUSCULAR; INTRAVENOUS; SUBCUTANEOUS at 09:48

## 2019-08-31 RX ADMIN — HYDROMORPHONE HYDROCHLORIDE 4 MILLIGRAM(S): 2 INJECTION INTRAMUSCULAR; INTRAVENOUS; SUBCUTANEOUS at 05:14

## 2019-08-31 RX ADMIN — SCOPALAMINE 1 PATCH: 1 PATCH, EXTENDED RELEASE TRANSDERMAL at 07:33

## 2019-08-31 RX ADMIN — HYDROMORPHONE HYDROCHLORIDE 4 MILLIGRAM(S): 2 INJECTION INTRAMUSCULAR; INTRAVENOUS; SUBCUTANEOUS at 06:14

## 2019-08-31 RX ADMIN — HYDROMORPHONE HYDROCHLORIDE 4 MILLIGRAM(S): 2 INJECTION INTRAMUSCULAR; INTRAVENOUS; SUBCUTANEOUS at 10:25

## 2019-08-31 RX ADMIN — Medication 100 MILLIGRAM(S): at 13:07

## 2019-08-31 RX ADMIN — Medication 975 MILLIGRAM(S): at 13:07

## 2019-08-31 RX ADMIN — Medication 325 MILLIGRAM(S): at 05:14

## 2019-08-31 RX ADMIN — Medication 975 MILLIGRAM(S): at 05:14

## 2019-08-31 RX ADMIN — Medication 975 MILLIGRAM(S): at 13:37

## 2019-08-31 NOTE — HISTORY OF PRESENT ILLNESS
[de-identified] : Martha is a 24-year-old female here today with her mom and dad. She is visiting us from [place]. She has a history of diagnosed osteonecrosis of both of her hips. She is being referred to us by her local orthopedic surgeon. She had a severe reaction to a course of prednisone after an injury 6+ months ago, likely what has precipitated this condition. We have are spoken to her at on the phone in depth and a virtual visit and have indicated her for a right hip bone graft versus total hip and a left hip bone graft.

## 2019-08-31 NOTE — ASSESSMENT
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 She already has all preop clearances and laboratory testing done, we are scheduled for tomorrow for her procedure and we will proceed as planned.\par \par CASSIE will benefit from the proposed procedures she has failed a conservative treatment plan of supervised physical therapy, anti-inflammatory medications, and activity modification. She continues to have pain impacting her ability to perform ADL's and has a decreasing QoL.  We will schedule this for the earliest mutually convenient time after the appropriate pre-op laboratory tests and clearances are obtained.  All questions were answered and a thorough explanation of RBA were given.\par \par All medical record entries made by the PA/Scribe/Fellow are at my, Dr. Lowell Su's direction and personally dictated by me on 08/28/2019]. I have reviewed the chart and agree that the record accurately reflects my personal performance of the history, physical exam, assessment, and plan. I have also personally directed reviewed, and agreed with the chart.\par

## 2019-08-31 NOTE — PROGRESS NOTE ADULT - SUBJECTIVE AND OBJECTIVE BOX
Ortho Note    Pt comfortable without complaints, pain controlled  Denies CP, SOB, N/V, numbness/tingling     Vital Signs Last 24 Hrs  T(C): 38.3 (08-31-19 @ 05:15), Max: 38.3 (08-31-19 @ 05:15)  T(F): 101 (08-31-19 @ 05:15), Max: 101 (08-31-19 @ 05:15)  HR: 103 (08-31-19 @ 05:15) (103 - 103)  BP: 101/63 (08-31-19 @ 05:15) (101/63 - 101/63)  BP(mean): --  RR: 17 (08-31-19 @ 05:15) (17 - 17)  SpO2: 96% (08-31-19 @ 05:15) (96% - 96%)      General: Pt Alert and oriented, NAD  DSG C/D/I from graft and THR sites  Pulses: 2+ DP bilaterally  Sensation: SILT over distal limb and symmetric to contralateral side  Motor: 5+ EHL/FHL/TA/GS                          11.6   9.95  )-----------( 264      ( 30 Aug 2019 07:24 )             35.5   30 Aug 2019 07:24    139    |  103    |  5      ----------------------------<  130    3.7     |  25     |  0.57           A/P: 24yFemale POD2 s/p RTHA  - Stable  - Pain Control  - DVT ppx: Asa  - PT, WBS: WBAT    Ortho Pager [phone]

## 2019-08-31 NOTE — DISCHARGE NOTE NURSING/CASE MANAGEMENT/SOCIAL WORK - PATIENT PORTAL LINK FT
You can access the FollowMyHealth Patient Portal offered by [facility] by registering at the following website: [email or web address]. By joining [facility]’s FollowMyHealth portal, you will also be able to view your health information using other applications (apps) compatible with our system.

## 2019-08-31 NOTE — PHYSICAL EXAM
[de-identified] : General: Not in acute distress, dressed appropriately, sitting on examination table\par Skin: Warm and dry, normal turgor, no rashes\par Neurological: AOx3, Cranial nerves grossly in tact\par Psych: Mood and affect appropriate\par \par Bilateral Hip: No swelling edema erythema redness or drainage. Tender [anteriorly/laterally]. ROM: Hip flexion 120, abduction 30, int/ext rotation 45. Painful range of motion. 5/5 strength. Normal gait. [id number] [id number] QuxelTalwtac061Ips [id number] [id number] ZfmnaPufgpmm606Klxhm \par  [de-identified] : New x-rays today show a right hip with a segment of femoral head flattening between 11:00 and 2:00.\par \par The left hip shows a round femoral head with a lucent lesion in the superior weightbearing portion.

## 2019-09-04 DIAGNOSIS — M25.551 PAIN IN RIGHT HIP: ICD-10-CM

## 2019-09-04 DIAGNOSIS — M87.9 OSTEONECROSIS, UNSPECIFIED: ICD-10-CM

## 2019-09-04 DIAGNOSIS — R60.9 EDEMA, UNSPECIFIED: ICD-10-CM

## 2019-09-05 PROCEDURE — 97530 THERAPEUTIC ACTIVITIES: CPT

## 2019-09-05 PROCEDURE — 86850 RBC ANTIBODY SCREEN: CPT

## 2019-09-05 PROCEDURE — 85027 COMPLETE CBC AUTOMATED: CPT

## 2019-09-05 PROCEDURE — 72170 X-RAY EXAM OF PELVIS: CPT

## 2019-09-05 PROCEDURE — 97161 PT EVAL LOW COMPLEX 20 MIN: CPT

## 2019-09-05 PROCEDURE — C1889: CPT

## 2019-09-05 PROCEDURE — 36415 COLL VENOUS BLD VENIPUNCTURE: CPT

## 2019-09-05 PROCEDURE — 86900 BLOOD TYPING SEROLOGIC ABO: CPT

## 2019-09-05 PROCEDURE — 97116 GAIT TRAINING THERAPY: CPT

## 2019-09-05 PROCEDURE — 80048 BASIC METABOLIC PNL TOTAL CA: CPT

## 2019-09-05 PROCEDURE — C1776: CPT

## 2019-09-05 PROCEDURE — 86901 BLOOD TYPING SEROLOGIC RH(D): CPT

## 2019-09-05 PROCEDURE — 84702 CHORIONIC GONADOTROPIN TEST: CPT

## 2019-09-05 PROCEDURE — 97110 THERAPEUTIC EXERCISES: CPT

## 2019-10-08 PROBLEM — M87.9 OSTEONECROSIS: Status: ACTIVE | Noted: 2019-08-20

## 2019-12-05 ENCOUNTER — RX RENEWAL (OUTPATIENT)
Age: 24
End: 2019-12-05

## 2019-12-18 ENCOUNTER — OTHER (OUTPATIENT)
Age: 24
End: 2019-12-18

## 2020-01-31 RX ORDER — CELECOXIB 200 MG/1
200 CAPSULE ORAL
Qty: 60 | Refills: 0 | Status: ACTIVE | COMMUNITY
Start: 2020-01-31 | End: 1900-01-01

## 2020-05-20 NOTE — BRIEF OPERATIVE NOTE - NSICDXBRIEFPREOP_GEN_ALL_CORE_FT
Miguelangel Monahan [facility]  [address] [place] [address]  Phone: [phone]  Fax: [phone]  Follow Up Time: 1 week
PRE-OP DIAGNOSIS:  Avascular necrosis of bones of both hips 29-Aug-2019 16:54:36  Lauren Douglas

## 2021-03-24 NOTE — PATIENT PROFILE ADULT - FUNCTIONAL SCREEN CURRENT LEVEL: EATING, MLM
Problem: Patient/Family Goals  Goal: Patient/Family Long Term Goal  Description: Patient's Long Term Goal: to stay out of hospital    Interventions:  - take meds as prescribed, attend follow up appts, eat healthy/exercise  - See additional Care Plan goal by Bobby Vincent RN  Outcome: Progressing 0 = independent

## 2021-11-05 NOTE — PHYSICAL THERAPY INITIAL EVALUATION ADULT - IMPAIRMENTS CONTRIBUTING TO GAIT DEVIATIONS, PT EVAL
Pt seen and assessed at [facility] for saline infusion per orders from Dr. Marquis Moya. Infused per [facility] policy. Monitoring completed for infusion reactions - see flowsheet. Pt tolerated infusion well and without incident. Discharged via wheelchair to home with children.  Electronically signed by Susana King RN on 11/5/2021 at 10:41 AM
decreased ROM/impaired balance/pain/decreased strength

## 2023-06-23 NOTE — DISCHARGE NOTE PROVIDER - NSDCACTIVITY_GEN_ALL_CORE
negative Regular rate & rhythm, normal S1, S2; no murmurs, gallops or rubs; no S3, S4 Do not drive or operate machinery/Do not make important decisions/Showering allowed

## 2024-08-01 NOTE — BRIEF OPERATIVE NOTE - NSICDXBRIEFPROCEDURE_GEN_ALL_CORE_FT
PROCEDURES:  Bone graft to left femur from hip 29-Aug-2019 16:54:02  Lauren Douglas  Right total hip arthroplasty 29-Aug-2019 16:53:44  Lauren Douglas
Review of prior medical records, evaluation and management and coordination of care with provider team.

## 2025-05-29 NOTE — DISCHARGE NOTE PROVIDER - NSDCHC_MEDRECSTATUS_GEN_ALL_CORE
[facility]  Outpatient Physical Therapy    Treatment Note        Date: 2025  Patient: Pierre Kaur  : 1962   Confirmed: Yes  MRN: [id number]  Referring Provider: Bala Scott PA   Secondary Referring Provider (If applicable):     Medical Diagnosis: Presence of left artificial hip joint [Z96.642]    Treatment Diagnosis: B hip pain, decrased L LE strength, decerased B hip AROM, decreased balance, impaired gait    Visit Information:  Insurance: Payor: [facility] / Plan: [facility] MEDICAID / Product Type: *No Product type* /   PT Visit Information  Total # of Visits Approved: 30  Total # of Visits to Date: 8  No Show: 0  Canceled Appointment: 0  Progress Note Counter: 8/10    Subjective Information:  Subjective: Pt reports Rt LE pain > Lt and Lt is doing well.  HEP Compliance:  [x] Good [] Fair [] Poor [] Reports not doing due to:    Pain Screening  Patient Currently in Pain: Denies (left side)  Pain Level: 2  Pain Location: Hip  Pain Orientation: Right    Treatment:  Exercises:  Exercises  Exercise 9: SLS Lt with finger touch assist  Exercise 10: 6\" repetitive hurdles static x10 with LT with VCs to avoid circumduction  Exercise 14: Nu step L7 x5 min  Exercise 16: gait with str cane  Exercise 17: picking up objects  Exercise 18: sink exs x10 except hip ext  Exercise 20: HEP: sink exs without hip ext       *Indicates exercise, modality, or manual techniques to be initiated when appropriate    Objective Measures:                                 Strength RLE  Comment:  except hip flex 4+/5  Strength LLE  Comment:  hip flex, IR/ ER 4/5 , knee 5/5, DF 5/5                AROM LLE (degrees)  LLE General AROM: WFL                                                    LTG 1 Current Status:: : independent and compliant with HEP  LTG 2 Current Status:: :  LEFS        LTG 5 Current Status:: : Pt able to do stairs with non-reciprocal pattern due to Rt LE  Admission Reconciliation is Completed  Discharge Reconciliation is Completed